# Patient Record
Sex: FEMALE | Race: WHITE
[De-identification: names, ages, dates, MRNs, and addresses within clinical notes are randomized per-mention and may not be internally consistent; named-entity substitution may affect disease eponyms.]

---

## 2017-05-28 ENCOUNTER — HOSPITAL ENCOUNTER (EMERGENCY)
Dept: HOSPITAL 47 - EC | Age: 22
Discharge: HOME | End: 2017-05-28
Payer: COMMERCIAL

## 2017-05-28 VITALS — SYSTOLIC BLOOD PRESSURE: 103 MMHG | HEART RATE: 75 BPM | DIASTOLIC BLOOD PRESSURE: 58 MMHG | TEMPERATURE: 98.6 F

## 2017-05-28 VITALS — RESPIRATION RATE: 16 BRPM

## 2017-05-28 DIAGNOSIS — R11.2: Primary | ICD-10-CM

## 2017-05-28 DIAGNOSIS — Z79.899: ICD-10-CM

## 2017-05-28 DIAGNOSIS — R31.9: ICD-10-CM

## 2017-05-28 DIAGNOSIS — R19.7: ICD-10-CM

## 2017-05-28 LAB
ALP SERPL-CCNC: 62 U/L (ref 38–126)
ALT SERPL-CCNC: 31 U/L (ref 9–52)
AMYLASE SERPL-CCNC: 73 U/L (ref 30–110)
ANION GAP SERPL CALC-SCNC: 10 MMOL/L
AST SERPL-CCNC: 21 U/L (ref 14–36)
BASOPHILS # BLD AUTO: 0.1 K/UL (ref 0–0.2)
BASOPHILS NFR BLD AUTO: 1 %
BUN SERPL-SCNC: 14 MG/DL (ref 7–17)
CALCIUM SPEC-MCNC: 9.8 MG/DL (ref 8.4–10.2)
CH: 32
CHCM: 34.3
CHLORIDE SERPL-SCNC: 108 MMOL/L (ref 98–107)
CO2 SERPL-SCNC: 25 MMOL/L (ref 22–30)
EOSINOPHIL # BLD AUTO: 0.3 K/UL (ref 0–0.7)
EOSINOPHIL NFR BLD AUTO: 1 %
ERYTHROCYTE [DISTWIDTH] IN BLOOD BY AUTOMATED COUNT: 4.83 M/UL (ref 3.8–5.4)
ERYTHROCYTE [DISTWIDTH] IN BLOOD: 12.8 % (ref 11.5–15.5)
GLUCOSE SERPL-MCNC: 91 MG/DL (ref 74–99)
HCT VFR BLD AUTO: 45.3 % (ref 34–46)
HDW: 2.45
HGB BLD-MCNC: 15.1 GM/DL (ref 11.4–16)
KETONES UR QL STRIP.AUTO: (no result)
LUC NFR BLD AUTO: 1 %
LYMPHOCYTES # SPEC AUTO: 1.8 K/UL (ref 1–4.8)
LYMPHOCYTES NFR SPEC AUTO: 10 %
MCH RBC QN AUTO: 31.3 PG (ref 25–35)
MCHC RBC AUTO-ENTMCNC: 33.3 G/DL (ref 31–37)
MCV RBC AUTO: 93.8 FL (ref 80–100)
MONOCYTES # BLD AUTO: 0.7 K/UL (ref 0–1)
MONOCYTES NFR BLD AUTO: 4 %
NEUTROPHILS # BLD AUTO: 14.4 K/UL (ref 1.3–7.7)
NEUTROPHILS NFR BLD AUTO: 83 %
NON-AFRICAN AMERICAN GFR(MDRD): >60
PARTICLE COUNT: 4361
PH UR: 6.5 [PH] (ref 5–8)
POTASSIUM SERPL-SCNC: 4.1 MMOL/L (ref 3.5–5.1)
PROT SERPL-MCNC: 7.1 G/DL (ref 6.3–8.2)
PROT UR QL: (no result)
RBC UR QL: >182 /HPF (ref 0–5)
SODIUM SERPL-SCNC: 143 MMOL/L (ref 137–145)
SP GR UR: 1.02 (ref 1–1.03)
UA BILLING (MACRO VS. MICRO): (no result)
UROBILINOGEN UR QL STRIP: 2 MG/DL (ref ?–2)
WBC # BLD AUTO: 0.22 10*3/UL
WBC # BLD AUTO: 17.4 K/UL (ref 3.8–10.6)
WBC #/AREA URNS HPF: 13 /HPF (ref 0–5)
WBC (PEROX): 16.14

## 2017-05-28 PROCEDURE — 71020: CPT

## 2017-05-28 PROCEDURE — 82150 ASSAY OF AMYLASE: CPT

## 2017-05-28 PROCEDURE — 36415 COLL VENOUS BLD VENIPUNCTURE: CPT

## 2017-05-28 PROCEDURE — 83690 ASSAY OF LIPASE: CPT

## 2017-05-28 PROCEDURE — 85025 COMPLETE CBC W/AUTO DIFF WBC: CPT

## 2017-05-28 PROCEDURE — 96374 THER/PROPH/DIAG INJ IV PUSH: CPT

## 2017-05-28 PROCEDURE — 81025 URINE PREGNANCY TEST: CPT

## 2017-05-28 PROCEDURE — 96361 HYDRATE IV INFUSION ADD-ON: CPT

## 2017-05-28 PROCEDURE — 81001 URINALYSIS AUTO W/SCOPE: CPT

## 2017-05-28 PROCEDURE — 96375 TX/PRO/DX INJ NEW DRUG ADDON: CPT

## 2017-05-28 PROCEDURE — 99284 EMERGENCY DEPT VISIT MOD MDM: CPT

## 2017-05-28 PROCEDURE — 80053 COMPREHEN METABOLIC PANEL: CPT

## 2017-05-28 NOTE — XR
EXAMINATION TYPE: XR chest 2V

 

DATE OF EXAM: 5/28/2017 5:56 PM

 

COMPARISON: NONE

 

HISTORY: Vomiting

 

TECHNIQUE:  Frontal and lateral views of the chest are obtained.

 

FINDINGS:  Heart and mediastinum are normal. Lungs are clear. Diaphragm is normal. Bony thorax appear
s normal.

 

IMPRESSION:  Normal chest

## 2017-05-28 NOTE — ED
General Adult HPI





<Dameon Ramos - Last Filed: 05/28/17 18:31>





- General


Source: patient, RN notes reviewed


Mode of arrival: ambulatory


Limitations: no limitations





<Arnav Plascencia - Last Filed: 05/28/17 18:34>





- General


Chief complaint: Nausea/Vomiting/Diarrhea


Stated complaint: Vomiting


Time Seen by Provider: 05/28/17 17:03





- History of Present Illness


Initial comments: 





Patient 21-year-old female who presents emergency room today with a chief 

complaint of symptoms of nausea vomiting started this one approximately 10 AM.  

She doesn't that she's had approximately 6 episodes of vomiting.  She states 

she was concerned that she did see what appeared to be some blood in one of the 

episodes.  She states she's vomited since there has been normal again.  She 

does admit some mild abdominal pain in the epigastric area. She denies any 

other complaints or symptoms at this time. Patient denies any recent fever, 

chills, shortness of breath, chest pain, back pain, numbness or tingling, 

dysuria or hematuria, constipation or diarrhea, headaches or visual changes, or 

any other complaints. (Arnav Plascencia)





- Related Data


 Home Medications











 Medication  Instructions  Recorded  Confirmed


 


SUMAtriptan SUCCINATE [Imitrex] 25 mg PO ONCE 12/30/16 12/30/16








 Previous Rx's











 Medication  Instructions  Recorded


 


Famotidine [Pepcid] 20 mg PO BID #20 tablet 05/28/17


 


Ondansetron Odt [Zofran ODT] 4 mg PO Q8HR PRN #20 tab 05/28/17











 Allergies











Allergy/AdvReac Type Severity Reaction Status Date / Time


 


No Known Allergies Allergy   Verified 05/28/17 16:46














Review of Systems


ROS Other: All systems not noted in ROS Statement are negative.





<Dameon Ramos - Last Filed: 05/28/17 18:31>


ROS Other: All systems not noted in ROS Statement are negative.





<Arnav Plascencia - Last Filed: 05/28/17 18:34>


ROS Statement: 


Those systems with pertinent positive or pertinent negative responses have been 

documented in the HPI.








Past Medical History


Past Medical History: No Reported History


History of Any Multi-Drug Resistant Organisms: None Reported


Past Surgical History: Orthopedic Surgery, Tonsillectomy


Additional Past Surgical History / Comment(s): Left knee; back tumor removed


Past Psychological History: No Psychological Hx Reported


Smoking Status: Never smoker


Past Alcohol Use History: Occasional


Past Drug Use History: None Reported





<Arnav Plascencia - Last Filed: 05/28/17 18:34>





General Exam





<RichardDameon - Last Filed: 05/28/17 18:31>


Limitations: no limitations





<Arnav Plascencia - Last Filed: 05/28/17 18:34>





- General Exam Comments


Initial Comments: 





General:  The patient is awake and alert, in no distress, and does not appear 

acutely ill. 


Eye:  Pupils are equal, round and reactive to light, extra-ocular movements are 

intact.  No nystagmus.  There is normal conjunctiva bilaterally.  No signs of 

icterus.  


Ears, nose, mouth and throat:  There are moist mucous membranes and no oral 

lesions. 


Neck:  The neck is supple, there is no tenderness or JVD.  


Cardiovascular:  There is a regular rate and rhythm. No murmur, rub or gallop 

is appreciated.


Respiratory:  Lungs are clear to auscultation, respirations are non-labored, 

breath sounds are equal.  No wheezes, stridor, rales, or rhonchi.


Gastrointestinal:  Soft, non-distended, non-tender abdomen without masses or 

organomegaly noted. There is no rebound or guarding present.  No CVA 

tenderness. Bowel sounds are unremarkable.


Musculoskeletal:  Normal ROM, no tenderness.  Strength 5/5. Sensation intact. 

Pulses equal bilaterally 2+.  


Neurological:  A&O x 3. CN II-XII intact, There are no obvious motor or sensory 

deficits. Coordination appears grossly intact. Speech is normal.


Skin:  Skin is warm and dry and no rashes or lesions are noted. 


Psychiatric:  Cooperative, appropriate mood & affect, normal judgment.   (Arnav Plascencia)





Medical Decision Making





- Lab Data


Result diagrams: 


 05/28/17 17:40





 05/28/17 17:40





<Dameon Ramos - Last Filed: 05/28/17 18:31>





- Lab Data


Result diagrams: 


 05/28/17 17:40





 05/28/17 17:40





<Arnav Plascencia - Last Filed: 05/28/17 18:34>





- Medical Decision Making





Medical decision-making.  The patient reports that she had rather violent 

vomiting several times and then noticed some blood in the third and fourth 

vomit and then disappeared and the fifth and again the sixth.  On the seventh.  

Patient reports when she gets her menstrual cycle she normally has bilateral 

vomiting but has never had bleeding before.  She has never had any trouble with 

gastritis or stomach ulcers.  At this time she has no pain in the epigastric 

region.  Vital signs remained stable.  Examination is normal.  We discussed 

leaving after retching.  The plant this time for the patient be placed on 

Pepcid and Zofran.  She'll be advised to discuss situation with the family 

doctor.  Again reexamination the abdomen is benign no rebound or referred pain 

with normoactive bowel sounds.  Dr. Ramos (Dameon Ramos)





Patient examined at this time shows no signs of distress.  Patient lives been 

reviewed does show 17,000 white count.  Patient does have a large amount of 

blood in her urine she's currently on her menstrual cycle.  She does admit that 

she saw blood in the emesis earlier today.  X-ray reviewed and is negative.  

She has no abdominal pain.  Her abdomen is soft nontender at this time.  Case 

was discussed in detail with attending physician Dr. Ramos who did see the 

patient at bedside.  Patient will be discharged home placed on Pepcid also 

given Zofran for her symptoms.  Advise close follow-up family doctor return 

here to the emergency room if any symptoms increase or worsen. (Arnav Plascencia)





- Lab Data


 Lab Results











  05/28/17 05/28/17 05/28/17 Range/Units





  17:30 17:30 17:40 


 


WBC     (3.8-10.6)  k/uL


 


RBC     (3.80-5.40)  m/uL


 


Hgb     (11.4-16.0)  gm/dL


 


Hct     (34.0-46.0)  %


 


MCV     (80.0-100.0)  fL


 


MCH     (25.0-35.0)  pg


 


MCHC     (31.0-37.0)  g/dL


 


RDW     (11.5-15.5)  %


 


Plt Count     (150-450)  k/uL


 


Neutrophils %     %


 


Lymphocytes %     %


 


Monocytes %     %


 


Eosinophils %     %


 


Basophils %     %


 


Neutrophils #     (1.3-7.7)  k/uL


 


Lymphocytes #     (1.0-4.8)  k/uL


 


Monocytes #     (0-1.0)  k/uL


 


Eosinophils #     (0-0.7)  k/uL


 


Basophils #     (0-0.2)  k/uL


 


Sodium    143  (137-145)  mmol/L


 


Potassium    4.1  (3.5-5.1)  mmol/L


 


Chloride    108 H  ()  mmol/L


 


Carbon Dioxide    25  (22-30)  mmol/L


 


Anion Gap    10  mmol/L


 


BUN    14  (7-17)  mg/dL


 


Creatinine    0.80  (0.52-1.04)  mg/dL


 


Est GFR (MDRD) Af Amer    >60  (>60 ml/min/1.73 sqM)  


 


Est GFR (MDRD) Non-Af    >60  (>60 ml/min/1.73 sqM)  


 


Glucose    91  (74-99)  mg/dL


 


Calcium    9.8  (8.4-10.2)  mg/dL


 


Total Bilirubin    1.4 H  (0.2-1.3)  mg/dL


 


AST    21  (14-36)  U/L


 


ALT    31  (9-52)  U/L


 


Alkaline Phosphatase    62  ()  U/L


 


Total Protein    7.1  (6.3-8.2)  g/dL


 


Albumin    4.4  (3.5-5.0)  g/dL


 


Amylase    73  ()  U/L


 


Lipase    262  ()  U/L


 


Urine Color   Light Red   


 


Urine Appearance   Cloudy H   (Clear)  


 


Urine pH   6.5   (5.0-8.0)  


 


Ur Specific Gravity   1.024   (1.001-1.035)  


 


Urine Protein   1+ H   (Negative)  


 


Urine Glucose (UA)   Negative   (Negative)  


 


Urine Ketones   1+ H   (Negative)  


 


Urine Blood   Large H   (Negative)  


 


Urine Nitrite   Negative   (Negative)  


 


Urine Bilirubin   Negative   (Negative)  


 


Urine Urobilinogen   2.0   (<2.0)  mg/dL


 


Ur Leukocyte Esterase   Small H   (Negative)  


 


Urine RBC   >182 H   (0-5)  /hpf


 


Urine WBC   13 H   (0-5)  /hpf


 


Urine Mucus   Rare H   (None)  /hpf


 


Urine HCG, Qual  Not Detected    (Not Detectd)  














  05/28/17 Range/Units





  17:40 


 


WBC  17.4 H  (3.8-10.6)  k/uL


 


RBC  4.83  (3.80-5.40)  m/uL


 


Hgb  15.1  (11.4-16.0)  gm/dL


 


Hct  45.3  (34.0-46.0)  %


 


MCV  93.8  (80.0-100.0)  fL


 


MCH  31.3  (25.0-35.0)  pg


 


MCHC  33.3  (31.0-37.0)  g/dL


 


RDW  12.8  (11.5-15.5)  %


 


Plt Count  317  (150-450)  k/uL


 


Neutrophils %  83  %


 


Lymphocytes %  10  %


 


Monocytes %  4  %


 


Eosinophils %  1  %


 


Basophils %  1  %


 


Neutrophils #  14.4 H  (1.3-7.7)  k/uL


 


Lymphocytes #  1.8  (1.0-4.8)  k/uL


 


Monocytes #  0.7  (0-1.0)  k/uL


 


Eosinophils #  0.3  (0-0.7)  k/uL


 


Basophils #  0.1  (0-0.2)  k/uL


 


Sodium   (137-145)  mmol/L


 


Potassium   (3.5-5.1)  mmol/L


 


Chloride   ()  mmol/L


 


Carbon Dioxide   (22-30)  mmol/L


 


Anion Gap   mmol/L


 


BUN   (7-17)  mg/dL


 


Creatinine   (0.52-1.04)  mg/dL


 


Est GFR (MDRD) Af Amer   (>60 ml/min/1.73 sqM)  


 


Est GFR (MDRD) Non-Af   (>60 ml/min/1.73 sqM)  


 


Glucose   (74-99)  mg/dL


 


Calcium   (8.4-10.2)  mg/dL


 


Total Bilirubin   (0.2-1.3)  mg/dL


 


AST   (14-36)  U/L


 


ALT   (9-52)  U/L


 


Alkaline Phosphatase   ()  U/L


 


Total Protein   (6.3-8.2)  g/dL


 


Albumin   (3.5-5.0)  g/dL


 


Amylase   ()  U/L


 


Lipase   ()  U/L


 


Urine Color   


 


Urine Appearance   (Clear)  


 


Urine pH   (5.0-8.0)  


 


Ur Specific Gravity   (1.001-1.035)  


 


Urine Protein   (Negative)  


 


Urine Glucose (UA)   (Negative)  


 


Urine Ketones   (Negative)  


 


Urine Blood   (Negative)  


 


Urine Nitrite   (Negative)  


 


Urine Bilirubin   (Negative)  


 


Urine Urobilinogen   (<2.0)  mg/dL


 


Ur Leukocyte Esterase   (Negative)  


 


Urine RBC   (0-5)  /hpf


 


Urine WBC   (0-5)  /hpf


 


Urine Mucus   (None)  /hpf


 


Urine HCG, Qual   (Not Detectd)  














Disposition





<Dameon Ramos - Last Filed: 05/28/17 18:31>


Time of Disposition: 18:33





<eTmoArnav - Last Filed: 05/28/17 18:34>


Clinical Impression: 


 Nausea & vomiting





Disposition: HOME SELF-CARE


Condition: Good


Instructions:  Acute Nausea and Vomiting (ED)


Additional Instructions: 


Please use medication as discussed.  Please follow-up with family doctor in the 

next 2 days of symptoms have not improved.  Please return to emergency room if 

the symptoms increase or worsen or for any other concerns.


Prescriptions: 


Famotidine [Pepcid] 20 mg PO BID #20 tablet


Ondansetron Odt [Zofran ODT] 4 mg PO Q8HR PRN #20 tab


 PRN Reason: Nausea


Referrals: 


Na Sanders MD [Primary Care Provider] - 1-2 days

## 2018-01-28 ENCOUNTER — HOSPITAL ENCOUNTER (EMERGENCY)
Dept: HOSPITAL 47 - EC | Age: 23
Discharge: HOME | End: 2018-01-28
Payer: COMMERCIAL

## 2018-01-28 VITALS
DIASTOLIC BLOOD PRESSURE: 86 MMHG | TEMPERATURE: 98 F | HEART RATE: 68 BPM | RESPIRATION RATE: 18 BRPM | SYSTOLIC BLOOD PRESSURE: 116 MMHG

## 2018-01-28 DIAGNOSIS — R11.0: ICD-10-CM

## 2018-01-28 DIAGNOSIS — R10.84: ICD-10-CM

## 2018-01-28 DIAGNOSIS — R19.7: Primary | ICD-10-CM

## 2018-01-28 LAB
PH UR: 6.5 [PH] (ref 5–8)
SP GR UR: 1.02 (ref 1–1.03)
UROBILINOGEN UR QL STRIP: <2 MG/DL (ref ?–2)

## 2018-01-28 PROCEDURE — 99284 EMERGENCY DEPT VISIT MOD MDM: CPT

## 2018-01-28 PROCEDURE — 81003 URINALYSIS AUTO W/O SCOPE: CPT

## 2018-01-28 NOTE — ED
Nausea/Vomiting/Diarrhea HPI





- General


Chief complaint: Nausea/Vomiting/Diarrhea


Stated complaint: flu symptoms


Time Seen by Provider: 18 21:52


Source: patient


Mode of arrival: ambulatory


Limitations: no limitations





- History of Present Illness


Initial comments: 





This patient is a 22-year-old woman who resents with complaint of nausea and 

diarrhea going on for approximately 36 hours.  The patient states that her 

symptoms started yesterday earlier, and that she had about 7-8 bowel movements 

yesterday and about another 7-8 bowel movements today.  She is not having any 

blood or tarry stools.  She has also been feeling nauseated.  There has been 

some intermittent, diffuse abdominal cramping that goes away when she has bowel 

movement.  She denies recent antibiotic use, stating she did have a course of 

antibiotics 10-12 weeks ago.


MD complaint: nausea, diarrhea


Onset/Timin


-: hour(s)


Description of Diarrhea: water


Associated Abdominal Pain: Yes


Location: diffuse


Severity: mild


Quality: cramping


Consistency: intermittent


Improves with: none


Worsens with: none


Associated Symptoms: denies other symptoms





- Related Data


 Previous Rx's











 Medication  Instructions  Recorded


 


Ondansetron Odt [Zofran ODT] 4 mg PO Q8HR PRN #10 tab 18











 Allergies











Allergy/AdvReac Type Severity Reaction Status Date / Time


 


No Known Allergies Allergy   Verified 18 22:19














Review of Systems


ROS Statement: 


Those systems with pertinent positive or pertinent negative responses have been 

documented in the HPI.





ROS Other: All systems not noted in ROS Statement are negative.


Constitutional: Denies: fever, chills, weakness


Respiratory: Denies: cough, dyspnea


Cardiovascular: Denies: chest pain, palpitations, edema


Gastrointestinal: Reports: as per HPI, abdominal pain, nausea, diarrhea.  Denies

: vomiting, constipation, melena, hematochezia


Genitourinary: Denies: dysuria, hematuria


Musculoskeletal: Denies: back pain


Skin: Denies: rash


Neurological: Denies: headache, weakness





Past Medical History


Past Medical History: No Reported History


History of Any Multi-Drug Resistant Organisms: None Reported


Past Surgical History: Adenoidectomy, Orthopedic Surgery, Tonsillectomy


Additional Past Surgical History / Comment(s): Left knee; back tumor removed


Past Psychological History: No Psychological Hx Reported


Smoking Status: Never smoker


Past Alcohol Use History: Occasional


Past Drug Use History: None Reported





General Exam


Limitations: no limitations


General appearance: alert, in no apparent distress


Head exam: Present: atraumatic, normocephalic


Eye exam: Present: normal appearance, PERRL, EOMI.  Absent: scleral icterus, 

conjunctival injection


ENT exam: Present: normal oropharynx, mucous membranes moist


Respiratory exam: Present: normal lung sounds bilaterally.  Absent: respiratory 

distress, wheezes, rales, rhonchi, stridor


Cardiovascular Exam: Present: regular rate, normal rhythm, normal heart sounds.

  Absent: systolic murmur, diastolic murmur, rubs, gallop


GI/Abdominal exam: Present: soft.  Absent: distended, tenderness, guarding, 

rebound, mass, pulsatile mass, hernia


Extremities exam: Present: normal inspection, normal capillary refill.  Absent: 

pedal edema, calf tenderness


Back exam: Present: normal inspection.  Absent: CVA tenderness (R), CVA 

tenderness (L)


Skin exam: Present: warm, dry, intact, normal color.  Absent: rash





Course


 Vital Signs











  18





  21:22


 


Temperature 97.4 F L


 


Pulse Rate 78


 


Respiratory 16





Rate 


 


Blood Pressure 124/74


 


O2 Sat by Pulse 99





Oximetry 














Medical Decision Making





- Lab Data


 Lab Results











  18 Range/Units





  22:26 


 


Urine Color  Yellow  


 


Urine Appearance  Clear  (Clear)  


 


Urine pH  6.5  (5.0-8.0)  


 


Ur Specific Gravity  1.017  (1.001-1.035)  


 


Urine Protein  Negative  (Negative)  


 


Urine Glucose (UA)  Negative  (Negative)  


 


Urine Ketones  Negative  (Negative)  


 


Urine Blood  Negative  (Negative)  


 


Urine Nitrite  Negative  (Negative)  


 


Urine Bilirubin  Negative  (Negative)  


 


Urine Urobilinogen  <2.0  (<2.0)  mg/dL


 


Ur Leukocyte Esterase  Negative  (Negative)  














Disposition


Clinical Impression: 


 Diarrhea





Disposition: HOME SELF-CARE


Condition: Good


Instructions:  Acute Diarrhea (ED)


Prescriptions: 


Ondansetron Odt [Zofran ODT] 4 mg PO Q8HR PRN #10 tab


 PRN Reason: Nausea


Referrals: 


Na Sanders MD [Primary Care Provider] - 1-2 days